# Patient Record
Sex: FEMALE | Race: WHITE
[De-identification: names, ages, dates, MRNs, and addresses within clinical notes are randomized per-mention and may not be internally consistent; named-entity substitution may affect disease eponyms.]

---

## 2019-07-15 ENCOUNTER — HOSPITAL ENCOUNTER (OUTPATIENT)
Dept: HOSPITAL 84 - D.CT | Age: 64
Discharge: HOME | End: 2019-07-15
Attending: INTERNAL MEDICINE
Payer: MEDICARE

## 2019-07-15 DIAGNOSIS — I71.2: Primary | ICD-10-CM

## 2019-08-20 ENCOUNTER — HOSPITAL ENCOUNTER (OUTPATIENT)
Dept: HOSPITAL 84 - D.HCCARDIO | Age: 64
Discharge: HOME | End: 2019-08-20
Attending: INTERNAL MEDICINE
Payer: MEDICARE

## 2019-08-20 DIAGNOSIS — I20.9: Primary | ICD-10-CM

## 2020-09-22 ENCOUNTER — HOSPITAL ENCOUNTER (OUTPATIENT)
Dept: HOSPITAL 84 - D.CATH | Age: 65
Discharge: HOME | End: 2020-09-22
Attending: INTERNAL MEDICINE
Payer: COMMERCIAL

## 2020-09-22 VITALS — SYSTOLIC BLOOD PRESSURE: 126 MMHG | DIASTOLIC BLOOD PRESSURE: 67 MMHG

## 2020-09-22 VITALS
WEIGHT: 190 LBS | BODY MASS INDEX: 30.53 KG/M2 | HEIGHT: 66 IN | BODY MASS INDEX: 30.53 KG/M2 | WEIGHT: 190 LBS | HEIGHT: 66 IN

## 2020-09-22 DIAGNOSIS — R07.9: Primary | ICD-10-CM

## 2020-09-22 DIAGNOSIS — I10: ICD-10-CM

## 2020-09-22 DIAGNOSIS — I20.9: ICD-10-CM

## 2020-09-22 DIAGNOSIS — I71.2: ICD-10-CM

## 2020-09-22 DIAGNOSIS — I49.9: ICD-10-CM

## 2020-09-22 LAB
ALT SERPL-CCNC: 27 U/L (ref 10–68)
ANION GAP SERPL CALC-SCNC: 11.3 MMOL/L (ref 8–16)
BASOPHILS NFR BLD AUTO: 0.6 % (ref 0–2)
BUN SERPL-MCNC: 15 MG/DL (ref 7–18)
CALCIUM SERPL-MCNC: 8.9 MG/DL (ref 8.5–10.1)
CHLORIDE SERPL-SCNC: 103 MMOL/L (ref 98–107)
CHOLEST/HDLC SERPL: 3.4 RATIO (ref 2.3–4.1)
CO2 SERPL-SCNC: 27.9 MMOL/L (ref 21–32)
CREAT SERPL-MCNC: 0.9 MG/DL (ref 0.6–1.3)
EOSINOPHIL NFR BLD: 2.4 % (ref 0–7)
ERYTHROCYTE [DISTWIDTH] IN BLOOD BY AUTOMATED COUNT: 13.1 % (ref 11.5–14.5)
GLUCOSE SERPL-MCNC: 90 MG/DL (ref 74–106)
HCT VFR BLD CALC: 43.1 % (ref 36–48)
HDLC SERPL-MCNC: 59 MG/DL (ref 32–96)
HGB BLD-MCNC: 14.3 G/DL (ref 12–16)
IMM GRANULOCYTES NFR BLD: 0.2 % (ref 0–5)
LDL-HDL RATIO: 2 RATIO (ref 1.5–3.5)
LDLC SERPL-MCNC: 118 MG/DL (ref 0–100)
LYMPHOCYTES NFR BLD AUTO: 30 % (ref 15–50)
MCH RBC QN AUTO: 31.4 PG (ref 26–34)
MCHC RBC AUTO-ENTMCNC: 33.2 G/DL (ref 31–37)
MCV RBC: 94.5 FL (ref 80–100)
MONOCYTES NFR BLD: 8.1 % (ref 2–11)
NEUTROPHILS NFR BLD AUTO: 58.7 % (ref 40–80)
OSMOLALITY SERPL CALC.SUM OF ELEC: 276 MOSM/KG (ref 275–300)
PLATELET # BLD: 219 10X3/UL (ref 130–400)
PMV BLD AUTO: 8.7 FL (ref 7.4–10.4)
POTASSIUM SERPL-SCNC: 4.2 MMOL/L (ref 3.5–5.1)
RBC # BLD AUTO: 4.56 10X6/UL (ref 4–5.4)
SODIUM SERPL-SCNC: 138 MMOL/L (ref 136–145)
TRIGL SERPL-MCNC: 111 MG/DL (ref 30–200)
WBC # BLD AUTO: 6.3 10X3/UL (ref 4.8–10.8)

## 2020-09-22 NOTE — NUR
PT RESTING QUIETLY.  R GROIN SITE SOFT, NO S/S BLEEDING OR HEMATOMA.
PULSES EASILY PALP. VSS. PT DENIES PAIN OR NEEDS.

## 2020-09-22 NOTE — NUR
R GROIN SITE SOFT, NO S/S BLEEDING OR HEMATOMA. R LEG/FOOT WARM WITH
PALP PULSES. VSS.  AT BS.  C/L IN REACH.

## 2020-09-22 NOTE — NUR
ALL DISCHARGE INSTRUCTIONS REVIEWED WITH PT AND , INCLUDING
RESTRICTIONS, MEDS AND F/U.  BOTH VERBALIZE UNDERSTANDING.

## 2020-09-22 NOTE — NUR
R GROIN SITE C/D/I. PIV D/C'D INTACT, DSG APPLIED.  PT ALLOWED UP TO
GET DRESSED AND GO TO BR INDEPENDENTLY.

## 2020-09-22 NOTE — NUR
R GROIN SITE SOFT, NO S/S BLEEDING OR HEMATOMA.  R LEG/FOOT WARM WITH
PALP PULSES.  VSS. C/L IN REACH.

## 2020-09-22 NOTE — NUR
PT ATE ALL OF SANDWICH, NO N/V. VSS. GROIN SITE SOFT, NO S/S BLEEDING
OR HEMATOMA. ALARMS ON AND C/L IN REACH.

## 2020-09-22 NOTE — HEMODYNAMI
PATIENT:EMILIANO MACE                                MEDICAL RECORD: [id number]
: 51                                            LOCATION:[facility]          
ACCT# [id number]                                       ADMISSION DATE: 20
 
 
 Generatedon:202010:06
Patient name: EMILIANO MACE Patient #: [id number] Visit #: [id number] SSN: [id number]
4311 :
1955 Date of study: 2020
Page: Of
Hemodynamic Procedure Report
****************************
Patient Data
Patient Demographics
Procedure consent was obtained
First Name: EMILIANO            Gender: Female
Last Name: NAKITA           : 1955
Middle Initial: DAYDAY       Age: 65 year(s)
Patient #: [id number]       Race: 
Visit #: [id number]
SSN: [id number]
Accession #:
[id number]
Additional ID: [id number]
Contact details
Address: [address] [place] [address]    Phone: [phone]
State: [place]
City: [place]
Zip code: [address]
Past Medical History
Allergies
Allergen        Reaction        Date         Comments
Reported
Other allergy                   2020    [facility]
Admission
Admission Data
Admission Date: 2020   Admission Time: 7:26
Arrival Date: 2020     Arrival Time: 0:00
Lab Results
Lab Result Date: 2020  Lab Result Time: 0:00
Biochemistry
Name         Units    Result                Min      Max
BUN          mg/dl    15       --(--*-)--   7        18
Creatinine   mg/dl    0.9      --(-*--)--   0.6      1.3
eGFR         ml/min   66       *-(----)--   90       120
NONAFRICAN
CBC
Name         Units    Result                Min      Max
Hemoglobin   g/dl     14.3     --(*---)--   13.5     17.5
Procedure
Procedure Types
Cath Procedure
Diagnostic Procedure
[facility] w/Coronaries
Aortic Root Angiography
Sedation Charges
Moderate Sedation up to 15 minutes
 
Procedure Description
Procedure Date
Procedure Date: 2020
Procedure Start Time: 9:48
Procedure End Time: 10:04
Procedure Staff
Name                            Function
Asad Jasso MD                   Performing Physician
Sylvia Burt RT               Monitor
Anita Joya RN             Nurse
Amparo Cid RT              Scrub
Indication
Chest pain
Procedure Data
Cath Procedure
Fluoroscopy
Diagnostic fluoroscopy      Total fluoroscopy Time: 2.8
time: 2.8 min               min
Diagnostic fluoroscopy      Total fluoroscopy dose: 379
dose: 379 mGy               mGy
Contrast Material
Contrast Material Type                       Amount (ml)
Isovue 300                                   76
Entry Location
Entry     Primary  Successful  Side  Size  Upsize Upsize Entry    Closure Succes
sful  Closure
Location                             (Fr)  1 (Fr) 2 (Fr) Remarks  Device        
      Remarks
Femoral                        Right 5 Fr                         Exoseal
artery
Estimated blood loss: 10 ml
Diagnostic catheters
Device Type               Used For           End Catheter
Placement
MULTIPACK JL 4.0 5Fr      Procedure
catheter
DIAGNOSTIC JL 5 5Fr       Procedure
catheter ([id number])
MULTIPACK 3DRC 5Fr        Procedure
catheter
MULTIPACK Pigtail 5 Fr    Procedure
catheter
Procedure Complications
No complications
Procedure Medications
Medication           Administration Route Dosage
0.9% NaCl            I.V.                 100 ml/hr
Lidocaine 2%         added to field       20
Heparin Flush Bag    added to field       2 bags
(1000units/500ml NS)
Oxygen               NC                   2 l/min
Versed               I.V.                 1 mg
Fentanyl                                  50 mcg
Versed               I.V.                 1 mg
Fentanyl                                  50 mcg
Hemodynamics
Rest
HGB: 14.3 (g/dl) Heart Rate: 56 (bpm)
Pressure Samples
Time     Site     Value (mmHg) Purpose      Heart      Use
 
Rate(bpm)
9:59     LV       116/-6,8     Snapshot     83
9:59     LV       124/-5,10    Snapshot     83
10:00    AO       119/64(88)   Pullback     74
10:00    LV       134/-3,10    Pullback     74
Gradients
Valve  Time  Site 1    Site 2     Mean    SEP/DFP    Peak To Heart  Use
(mmHg)  (sec/min)  Peak    Rate
(mmHg)  (bpm)
Aortic 10:00 LV        AO         15      19         15      74
134/-3,10 119/64(88)
Calculations
Valve    P-P      Mean      Valve     Index  Valve    Source
Name              Gradient  Area             Flow
(cm2)
Aortic   15       15
15       15
Snapshots
Pre Cath      Intra         NCS           Post Cath
Vital Signs
Time     Heart  Resp   SPO2 etCO2   NIBP (mmHg) Rhythm  Pain    Sedation
Rate   (ipm)  (%)  (mmHg)                      Status  Level
(bpm)
9:29:56  61     15     99   1.5     136/79(118) NSR     0 (11)  10(A)
, No
pain
9:34:13  57     20     100  31.6    146/78(124) NSR     0 (11)  10(A)
, No
pain
9:38:34  57     17     100  39.1    137/76(110) NSR     0 (11)  10(A)
, No
pain
9:42:56  60     15     100  41.4    123/67(98)  NSR     0 (11)  10(A)
, No
pain
9:47:10  61     15     100  37.6    126/73(100) NSR     0 (11)  10(A)
, No
pain
9:51:26  57     15     100  41.4    121/68(101) NSR     0 (11)  9(A)
, No
pain
9:55:38  62     14     100  44.4    115/69(95)  NSR     0 (11)  9(A)
, No
pain
9:59:48  80     15     100  42.1    117/78(102) NSR     0 (11)  9(A)
, No
pain
10:04:02 65     16     100  39.1    131/70(114) NSR     0 (11)  10(A)
, No
pain
Medications
Time    Medication       Route  Dose  Verified Delivered Reason     Notes  Effec
tiveness
by       by
9:28:59 0.9% NaCl        I.V.   100   Asad     Anita    used for
ml/hr Romero Joya procedure
RN
9:29:10 Lidocaine 2%     added  20ml  Asad     Anita    for local
to     vial  Romero Joya anesthetic
field                 RN
 
9:29:18 Heparin Flush    added  2     Asad     Anita    used for
Bag              to     bags  Romero Joya procedure
(1000units/500ml field                 RN
NS)
9:29:26 Oxygen           NC     2     Asad     Anita    for low 02
l/min Romero Joya sats
RN
9:43:52 Versed           I.V.   1 mg  Asad     Anita    for
Romero Celiselor sedation
RN
9:44:00 Fentanyl                50    Asad     Anita    for
mcg   Romero Celiselor sedation
RN
9:49:57 Versed           I.V.   1 mg  Asad     Anita    for
Romero Celiselor sedation
RN
9:50:01 Fentanyl                50    Asad     Anita    for
mcg   Romero Celiselor sedation
RN
Procedure Log
Time     Note
9:18:41  Informed consent obtained and on chart
9:19:04  Arrival Date: 2020 12:00:00 AM
9:20:38  Indication : Chest pain
9:20:54  Sylvia Burt RT(R) sent for patient. Start room use.
9:20:57  Procedure Status Elective Heart Cath (OP).
9:21:00  Time tracking: Regular hours (M-F 7:00 - 5:00)
9:21:09  Plan of Care:Hemodynamics will remain stable., Cardiac
rhythm will remain stable., Comfort level will be
maintained., Respiratory function will remain
adequate., Patient/ family verbilizes understanding of
procedure., Procedure tolerated without complication.,
Recovers from procedure without complications..
9:21:54  Patient allergic to Other allergyERYTHROMYCIN
BASE/SINGULAIR
9:24:21  Patient received from Pre/Post Procedure Room to CCL 1
Alert and oriented. Tansferred to table in Supine
position.
9:24:24  Warm blankets applied, and jian hugger turned on for
patient comfort.
9:24:24  Correct patient and procedure confirmed by team.
9:24:25  ECG and BP/O2 sat monitors applied to patient.
9:24:33  H&P Date Dictated: 2020 H&P Addendum completed by
physician on day of procedure. (MUST COMPLETE FOR ALL
OUTPATIENTS), New H&P dictated by physician..
9:24:39  Pre-procedure instructions explained to patient.
9:24:40  Pre-op teaching completed and patient verbalized
understanding.
9:24:42  Family in patients room.
9:24:45  Patient NPO since Midnight.
9:24:52  Is the patient allergic to Iodine/contrast media? No.
9:24:54  Was the patient premedicated? Yes
9:25:00  Is patient on blood thinner?No
9:25:03  Patient diabetic? No.
9:25:06  ----Pre-sedation anethsthesia assessment.----
9:25:11  Previous problem with sedation/anesthesia? No ?
9:28:48  Vital chart was started
9:28:59  0.9% NaCl 100 ml/hr I.V. was administered by Anita Joya RN; used for procedure; Verbal order read
back and verified.
 
9:29:10  Lidocaine 2% 20ml vial added to field was administered
by Anita Joya RN; for local anesthetic; Verbal
order read back and verified.
9:29:18  Heparin Flush Bag (1000units/500ml NS) 2 bags added to
field was administered by Anita Joya RN; used
for procedure; Verbal order read back and verified.
9:29:26  Oxygen 2 l/min NC was administered by Anita Joya RN; for low 02 sats; Verbal order read back and
verified.
9:32:03  Snore? No
9:32:04  Sleep apnea? No
9:32:13  Dentures? No ?
9:32:17  Baseline sample Acquired.
9:32:18  Full Disclosure recording started
9:34:39  Patient pain scale 0/10 ?.
9:34:46  IV patent on arrival in left forearm with 0.9% NaCl at
[facility].
9:35:25  Lab Result : BUN 15 mg/dl
9:35:25  Lab Result : eGFR NONAFRICAN 66 ml/min
9:35:25  Lab Result : Creatinine 0.9 mg/dl
9:35:25  Lab Result : Hemoglobin 14.3 g/dl
9:35:31  Lab results completed and on chart.
9:35:35  Stress Test: yes; normal ?
9:35:40  Right groin area was prepped with chlora-prep and
draped in sterile fashion
9:35:41  Alarms reviewed by R. N.
9:35:42  Sharps counted by scrub and verified by R.N.
9:35:43  Physician arrived
9:43:26  --------ALL STOP TIME OUT------
9:43:27  Final Timeout: patient, procedure, and site verified
with staff and physician. All members of the team are
in agreement.
9:43:30  Right groin site verified by team.
9:43:35  Fire Safety Assessment: A--An alcohol-based skin
anteseptic being used preoperatively., C--Open oxygen
or nitrous oxide is being used., D--An ESU, laser, or
fiber-optic light is being used.
9:43:42  Physical assessment completed. ASA score P 2 - A
patient with mild systemic disease as per Asad Jasso MD.
9:43:47  2) 60-89 Mildly reduced kidney function, and other
findings (as for stage 1) point to kidney disease.
9:43:51  Maximum allowable contrast dose (3.7 X eGFR X 0.75)183
ml.
9:43:52  Versed 1 mg I.V. was administered by Anita Joya RN; for sedation; Verbal order read back and verified.
9:43:57  Sedation plan: IV Moderate Sedation Medication:Versed,
Fentanyl
9:44:00  Fentanyl 50 mcg was administered by Anita Joya RN; for sedation; Verbal order read back and verified.
9:44:23  Use device set Femoral Dx
9:44:24  ACIST Syringe (53751) opened to sterile field.
9:44:25  Bag Decanter (2002S) opened to sterile field.
9:44:25  Medline Cath Pack ([id number]) opened to sterile field.
9:44:26  ACIST Hand Control (40234) opened to sterile field.
9:44:27  ACIST Manifold (36794) opened to sterile field.
9:44:27  DIAGNOSTIC Multipack 5Fr catheter set (SM6768) opened
to sterile field.
9:44:28  Tegaderm 4 x 4 (1626W) opened to sterile field.
9:44:31  SHEATH 5FR Raymond (GQX484) opened to sterile field.
 
9:44:32  EMERALD Guide Wire (221-342) opened to sterile field.
9:47:52  Procedure started.
9:48:03  Local anesthetic to right femoral artery with
Lidocaine 2% by Asad Jasso MD.**INITIAL ACCESS ONLY**
9:49:57  Versed 1 mg I.V. was administered by Anita Joya RN; for sedation; Verbal order read back and verified.
9:50:01  Fentanyl 50 mcg was administered by Anita Joya RN; for sedation; Verbal order read back and verified.
9:52:24  A 5 Fr sheath was inserted into the Right Femoral
artery
9:52:35  A MULTIPACK JL 4.0 5Fr catheter was advanced over the
wire and used for Procedure.
9:53:04  Catheter removed.
9:53:25  A DIAGNOSTIC JL 5 5Fr catheter ([id number]) was advanced
over the wire and used for Procedure.
9:53:29  LCA angiography performed.
9:55:10  Catheter removed.
9:56:08  A MULTIPACK 3DRC 5Fr catheter was advanced over the
wire and used for Procedure.
9:56:14  RCA angiography performed.
9:58:06  Catheter removed.
9:58:14  Zero performed for pressure channel P1
9:58:16  Zero performed for pressure channel P1
9:58:24  Zero performed for pressure channel P1
9:58:27  Zero performed for pressure channel P1
9:58:36  Zero performed for pressure channel P1
9:59:08  A MULTIPACK Pigtail 5 Fr catheter was advanced over
the wire and used for Procedure.
10:00:10 LV gram done using DE [place] [facility]
10:00:36 Aortic Root visualized
10:00:51 LV hemodynamics recorded.
10:01:08 EXOSEAL 5Fr () opened to sterile field.
10:01:19 Catheter removed.
10:01:59 Sheath removed intact; hemostasis achieved with
Exoseal to the Right Femoral artery.
10:02:02 Procedure ended.(Physican Out)
10:02:18 Fluoroscopy time 02.80 minutes.
10:02:22 Fluoroscopy dose: 379 mGy
10:02:22 Flurop Dose total: 379
10:02:31 Dose Area Product 52177 mGy/cm.
10:02:47 Contrast amount:Isovue 300 76ml.
10:02:51 Maximum allowable dose exceeded? No.
10:02:52 Sharps counted by scrub and verified by R.N.
10:02:54 Insertion/operative site no bleeding no hematoma.
10:02:58 Post-op/insertion site Right Femoral artery dressed
using a 4 x 4 and Tegaderm.
10:03:02 Post Procedure Pulses reassessed and unchanged
10:03:08 Post-procedure physical assessment completed. ASA
score P 2 - A patient with mild systemic disease as
per Asad Jasso MD.
10:03:15 Post procedure rhythm: unchanged.
10:03:18 Estimated blood loss: 10 ml
10:03:20 Post procedure instruction explained to
patient.Patient verbalizes understanding.
10:03:42 Procedure type changed to Cath procedure, Diagnostic
procedure, LHC, [facility] w/Coronaries, Aortic Root
Angiography, Sedation Charges, Moderate Sedation up to
15 minutes
10:03:44 Procedure and supply charges have been captured,
reviewed, submitted and are correct.
 
10:04:09 Procedure Complication : No complications
10:04:12 Vital chart was stopped
10:04:18 [facility] Findings: mild to moderate CAD (<70%)
10:04:25 Operative report dictated upon procedure completion.
10:04:26 See physician's report for complete and final results.
10:04:31 Report given to Pre/Post Procedure Room.
10:04:33 Patient transfered to Pre/Post Procedure Room with
Stretcher.
10:04:35 Procedure ended.
10:04:35 Full Disclosure recording stopped
10:04:39 End room use (Document Last)
10:05:24 End room use (Document Last)
10:06:04 End room use (Document Last)
Device Usage
Item Name   Manufacture  Quantity  Catalog    Hospital Part    Current Minimal L
ot# /
Number     Charge   Number  Stock   Stock   Serial#
Code
ACIST       Acist        1         08739      088553   358454  623513  20
Syringe     Medical
(38406)     Systems Inc
Bag         Microtek     1         S      790969   91485   675327  5
Decanter    Medical Inc.
()
Medline     Medline      1         [id number]  336607   76688   941347  5
Cath Pack
([id number])
ACIST Hand  Acist        1         45312      643618   678394  131579  5
Control     Medical
(14738)     Systems Inc
ACIST       Acist        1         56060      618377   573388  295020  5
Manifold    Medical
(53681)     Systems Inc
DIAGNOSTIC  Cardinal     1         YY2993     724968   64399   649508  30
Multipack   Health
5Fr
catheter
set
([id number])
Tegaderm 4  3M           1         1626W      858732   452283  220040  5
x 4 (1626W)
SHEATH 5FR  Terumo       1         KSG754     432719   419581  688010  5
Raymond
(FYC348)
EMERALD     Cardinal     1         502-455    049295   980386  784099  5
Guide Wire  Health
(502-455)
MULTIPACK   Cardinal     1                                     156690  5
JL 4.0 5Fr  Health
catheter
DIAGNOSTIC  Cardinal     1         503451W    392416   920288  369740  5
JL 5 5Fr    Health
catheter
(229962F)
MULTIPACK   Cardinal     1                                     729215  5
3DRC 5Fr    Health
catheter
MULTIPACK   Cardinal     1                                     049304  5
Pigtail 5   Health
Fr catheter
 
EXOSEAL 5Fr Cardinal     1               848554   510165  259084  10
()     Health
Signature Audit [place]
Stage           Time        Signature      Unsigned
Intra-Procedure 2020   Sylvia Burt
10:05:24 AM RT(R)
Intra-Procedure 2020   Anita
10:06:04 AM Mahnaz RN
Intra-Procedure 2020   Asad Jasso MD
10:06:42 AM
 
 
 
 
 
 
 
 
 
 
 
 
 
 
 
 
 
 
 
 
 
 
 
 
 
 
 
 
 
 
 
 
 
 
 
 
 
 
 
 
 
 
 
 
 
[address] [address]

## 2020-09-22 NOTE — NUR
PT REC'D TO ROOM 3 VIA STRETCHER FROM CATH LAB. MONITORS ESTAB,
 AT BS. SEE RN ASSESSMENT.  ALARMS ON AND C/L IN REACH.

## 2020-09-22 NOTE — NUR
R GROIN SITE SOFT, NO S/S BLEEDING OR HEMATOMA. PULSES PALP.  HOB UP,
SANDWICH TRAY PROVIDED.  VSS. C/L IN REACH.